# Patient Record
Sex: FEMALE | Race: WHITE | NOT HISPANIC OR LATINO | Employment: UNEMPLOYED | URBAN - METROPOLITAN AREA
[De-identification: names, ages, dates, MRNs, and addresses within clinical notes are randomized per-mention and may not be internally consistent; named-entity substitution may affect disease eponyms.]

---

## 2021-09-30 ENCOUNTER — EVALUATION (OUTPATIENT)
Dept: PHYSICAL THERAPY | Facility: MEDICAL CENTER | Age: 56
End: 2021-09-30
Payer: COMMERCIAL

## 2021-09-30 DIAGNOSIS — G51.8 CRANIOFACIAL PAIN SYNDROME: ICD-10-CM

## 2021-09-30 DIAGNOSIS — G50.1 ATYPICAL FACIAL PAIN: Primary | ICD-10-CM

## 2021-09-30 DIAGNOSIS — R51.9 CRANIOFACIAL PAIN: ICD-10-CM

## 2021-09-30 PROCEDURE — 97112 NEUROMUSCULAR REEDUCATION: CPT | Performed by: PHYSICAL THERAPIST

## 2021-09-30 PROCEDURE — 97163 PT EVAL HIGH COMPLEX 45 MIN: CPT | Performed by: PHYSICAL THERAPIST

## 2021-09-30 NOTE — PROGRESS NOTES
PT Evaluation     Today's date: 2021  Patient name: Irena Garcia  : 1965  MRN: [id number]  Referring provider: Gretchen Franco DDS  Dx:   Encounter Diagnosis     ICD-10-CM    1  Atypical facial pain  G50 1    2  Craniofacial pain syndrome  G51 8    3  Craniofacial pain  R51 9                   Assessment  Assessment details: Irena Garcia is a pleasant 54 y o  female who presents with B craniofacial pain that began as a teenager with chronic bruxing and clenching both at night and during the day  She reports the problem has only worsened over time, especially after getting her most recent bridges 2 years ago  She has a night splint but forgot to bring it today  The patient's greatest concerns are worry over not knowing what's wrong and fear that there is no way to correct it  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 management through her neck and shoulders and chronic clenching/bruxing resulting in pathoanatomical symptoms of craniofacial pain syndrome and limiting her ability to chew and eat  Depending on my assessment of the night splint at next visit, she may need some adjustments to her night splint based upon her reports of pain at night and in the morning  However, that may also be a carry-over of daytime bruxism  Primary Impairments:  1) poor TMJ movement coordination - addressing with neuromotor retraining   2) poor cervicothoracic movement coordination - addressing with functional retraining    Symptom irritability: moderateUnderstanding of Dx/Px/POC: good   Prognosis: good  Prognosis details: Positive prognostic indicators include good understanding of diagnosis and treatment plan options  Negative prognostic indicators include chronicity of symptoms, anxiety and depression  Goals  Patient will be independent with home exercise program    Patient will be able to manage symptoms independently       Plan  Plan details: Prognosis above is given PT services and home program adherence  Patient would benefit from: skilled physical therapy  Planned therapy interventions: activity modification, joint mobilization, manual therapy, motor coordination training, neuromuscular re-education, patient education, self care, therapeutic activities, therapeutic exercise, home exercise program, behavior modification, postural training and functional ROM exercises  Duration in visits: 3  Duration in weeks: 8  Treatment plan discussed with: patient        Subjective Evaluation    History of Present Illness  Mechanism of injury: Insidious >30 years ago  Pain  At worst pain ratin    Patient Goals  Patient goals for therapy: decreased pain and independence with ADLs/IADLs          Objective     Static Posture     Head  Forward  Shoulders  Rounded  Postural Observations  Seated posture: fair  Standing posture: good        Palpation   Left   No palpable tenderness to the lateral pterygoid  Hypertonic in the levator scapulae, scalenes, sternocleidomastoid, suboccipitals, upper trapezius, masseter, temporalis and medial pterygoid  Tenderness of the levator scapulae, scalenes, sternocleidomastoid, suboccipitals, upper trapezius, masseter, temporalis and medial pterygoid  Right   No palpable tenderness to the lateral pterygoid  Hypertonic in the levator scapulae, scalenes, sternocleidomastoid, suboccipitals, upper trapezius, masseter, temporalis and medial pterygoid  Tenderness of the levator scapulae, scalenes, sternocleidomastoid, suboccipitals, upper trapezius, masseter, temporalis and medial pterygoid       Neurological Testing     Sensation   Cervical/Thoracic   Left   Intact: light touch    Right   Intact: light touch    Reflexes   Left   Biceps (C5/C6): normal (2+)  High's reflex: negative    Right   Biceps (C5/C6): normal (2+)  High's reflex: negative    Active Range of Motion   Cervical/Thoracic Spine     Normal active range of motion  Left Shoulder   Normal active range of motion    Right Shoulder   Normal active range of motion    Left Elbow   Normal active range of motion    Right Elbow   Normal active range of motion    Left Wrist   Normal active range of motion    Right Wrist   Normal active range of motion    Joint Play   Joints within functional limits: C1, C2, C3, C6 and C7     Hypomobile: C4 and C5     Strength/Myotome Testing   Cervical Spine     Left   Normal strength    Right   Normal strength    Tests   Cervical   Positive craniocervical flexion test   Negative lumbar distraction, alar ligament test, Sharp-Michael test and VBI       Left   Negative Spurling's Test A and cervical flexion-rotation test      Right   Negative Spurling's Test A and cervical flexion-rotation test      Ambulation   Weight-Bearing Status   Weight-Bearing Status (Left): full weight bearing   Weight-Bearing Status (Right): full weight-bearing      Observational Gait   Gait: within functional limits   TMJ   Jaw observations: facial symmetry within normal limits  Occlusion class: class I (normal)  Patient does not have a  cleft palate  Scalloping of tongue: yes  Cusp wear: yes  Jaw trauma: no  Prognathia: no  Retrognathia: no  Mursicatio buccarum: yes  Corrective appliance comments: did not bring device for inspection  Joint sounds left: clicking  Joint sounds right: clicking  Lateral bite test, Left: no pain  Lateral bite test, right: no pain  ROM: pain with movement  Opening (mm): 45 and right deflection   Lateral excursion, left (mm): 12  Lateral excursion, right (mm)t: 12   ROM comments: Significant compensation for decreased R anterior translation with increased L anterior translation             Precautions: none    Date: 9/30      Manual                                          Neuromuscular Re-education       TMJ relaxed position SK      Progressive relaxation SK      Diaphragmatic breathing SK      Walking meditation SK             Therapeutic Exercise Therapeutic Activities                     Gait Training                     Modalities

## 2021-09-30 NOTE — LETTER
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 management through her neck and shoulders and chronic clenching/bruxing resulting in pathoanatomical symptoms of craniofacial pain syndrome and limiting her ability to chew and eat  Depending on my assessment of the night splint at next visit, she may need some adjustments to her night splint based upon her reports of pain at night and in the morning  However, that may also be a carry-over of daytime bruxism, in which case I would recommend continuing with wearing her night splint regularly  Please review the attached evaluation summary from Molly's recent visit  Please verify that you agree with the plan of care by signing the attached order  If you have any questions or concerns, please do not hesitate to call  I sincerely appreciate the opportunity to share in the care of one of your patients and hope to have another opportunity to work with you in the near future  Sincerely,    Ortega Sanderson DPT, PhD            Referring Provider:      I certify that I have read the below Plan of Care and certify the need for these services furnished under this plan of treatment while under my care                      Zehra Ibarra DDS  [address] [facility] [id number]  [address] [place] [address] Place  Via Fax: [phone]          PT Evaluation     Today's date: 9/30/2021  Patient name: Annette Thang Crescencio  : 1965  MRN: [id number]  Referring provider: Natasha León DDS  Dx:   Encounter Diagnosis     ICD-10-CM    1  Atypical facial pain  G50 1    2  Craniofacial pain syndrome  G51 8    3  Craniofacial pain  R51 9                   Assessment  Assessment details: Vargas Mosquera is a pleasant 54 y o  female who presents with B craniofacial pain that began as a teenager with chronic bruxing and clenching both at night and during the day  She reports the problem has only worsened over time, especially after getting her most recent bridges 2 years ago  She has a night splint but forgot to bring it today  The patient's greatest concerns are worry over not knowing what's wrong and fear that there is no way to correct it  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 management through her neck and shoulders and chronic clenching/bruxing resulting in pathoanatomical symptoms of craniofacial pain syndrome and limiting her ability to chew and eat  Depending on my assessment of the night splint at next visit, she may need some adjustments to her night splint based upon her reports of pain at night and in the morning  However, that may also be a carry-over of daytime bruxism  Primary Impairments:  1) poor TMJ movement coordination - addressing with neuromotor retraining   2) poor cervicothoracic movement coordination - addressing with functional retraining    Symptom irritability: moderateUnderstanding of Dx/Px/POC: good   Prognosis: good  Prognosis details: Positive prognostic indicators include good understanding of diagnosis and treatment plan options  Negative prognostic indicators include chronicity of symptoms, anxiety and depression  Goals  Patient will be independent with home exercise program    Patient will be able to manage symptoms independently  Plan  Plan details: Prognosis above is given PT services and home program adherence    Patient would benefit from: skilled physical therapy  Planned therapy interventions: activity modification, joint mobilization, manual therapy, motor coordination training, neuromuscular re-education, patient education, self care, therapeutic activities, therapeutic exercise, home exercise program, behavior modification, postural training and functional ROM exercises  Duration in visits: 3  Duration in weeks: 8  Treatment plan discussed with: patient        Subjective Evaluation    History of Present Illness  Mechanism of injury: Insidious >30 years ago  Pain  At worst pain ratin    Patient Goals  Patient goals for therapy: decreased pain and independence with ADLs/IADLs          Objective     Static Posture     Head  Forward  Shoulders  Rounded  Postural Observations  Seated posture: fair  Standing posture: good        Palpation   Left   No palpable tenderness to the lateral pterygoid  Hypertonic in the levator scapulae, scalenes, sternocleidomastoid, suboccipitals, upper trapezius, masseter, temporalis and medial pterygoid  Tenderness of the levator scapulae, scalenes, sternocleidomastoid, suboccipitals, upper trapezius, masseter, temporalis and medial pterygoid  Right   No palpable tenderness to the lateral pterygoid  Hypertonic in the levator scapulae, scalenes, sternocleidomastoid, suboccipitals, upper trapezius, masseter, temporalis and medial pterygoid  Tenderness of the levator scapulae, scalenes, sternocleidomastoid, suboccipitals, upper trapezius, masseter, temporalis and medial pterygoid       Neurological Testing     Sensation   Cervical/Thoracic   Left   Intact: light touch    Right   Intact: light touch    Reflexes   Left   Biceps (C5/C6): normal (2+)  High's reflex: negative    Right   Biceps (C5/C6): normal (2+)  High's reflex: negative    Active Range of Motion   Cervical/Thoracic Spine     Normal active range of motion  Left Shoulder   Normal active range of motion    Right Shoulder Normal active range of motion    Left Elbow   Normal active range of motion    Right Elbow   Normal active range of motion    Left Wrist   Normal active range of motion    Right Wrist   Normal active range of motion    Joint Play   Joints within functional limits: C1, C2, C3, C6 and C7     Hypomobile: C4 and C5     Strength/Myotome Testing   Cervical Spine     Left   Normal strength    Right   Normal strength    Tests   Cervical   Positive craniocervical flexion test   Negative lumbar distraction, alar ligament test, Sharp-Michael test and VBI       Left   Negative Spurling's Test A and cervical flexion-rotation test      Right   Negative Spurling's Test A and cervical flexion-rotation test      Ambulation   Weight-Bearing Status   Weight-Bearing Status (Left): full weight bearing   Weight-Bearing Status (Right): full weight-bearing      Observational Gait   Gait: within functional limits   TMJ   Jaw observations: facial symmetry within normal limits  Occlusion class: class I (normal)  Patient does not have a  cleft palate  Scalloping of tongue: yes  Cusp wear: yes  Jaw trauma: no  Prognathia: no  Retrognathia: no  Mursicatio buccarum: yes  Corrective appliance comments: did not bring device for inspection  Joint sounds left: clicking  Joint sounds right: clicking  Lateral bite test, Left: no pain  Lateral bite test, right: no pain  ROM: pain with movement  Opening (mm): 45 and right deflection   Lateral excursion, left (mm): 12  Lateral excursion, right (mm)t: 12   ROM comments: Significant compensation for decreased R anterior translation with increased L anterior translation             Precautions: none    Date: 9/30      Manual                                          Neuromuscular Re-education       TMJ relaxed position SK      Progressive relaxation SK      Diaphragmatic breathing SK      Walking meditation SK             Therapeutic Exercise                                   Therapeutic Activities Gait Training                     Modalities

## 2021-10-18 ENCOUNTER — OFFICE VISIT (OUTPATIENT)
Dept: PHYSICAL THERAPY | Facility: MEDICAL CENTER | Age: 56
End: 2021-10-18
Payer: COMMERCIAL

## 2021-10-18 DIAGNOSIS — G51.8 CRANIOFACIAL PAIN SYNDROME: ICD-10-CM

## 2021-10-18 DIAGNOSIS — R51.9 CRANIOFACIAL PAIN: ICD-10-CM

## 2021-10-18 DIAGNOSIS — G50.1 ATYPICAL FACIAL PAIN: Primary | ICD-10-CM

## 2021-10-18 PROCEDURE — 97112 NEUROMUSCULAR REEDUCATION: CPT | Performed by: PHYSICAL THERAPIST
